# Patient Record
Sex: MALE | Race: WHITE | NOT HISPANIC OR LATINO | URBAN - METROPOLITAN AREA
[De-identification: names, ages, dates, MRNs, and addresses within clinical notes are randomized per-mention and may not be internally consistent; named-entity substitution may affect disease eponyms.]

---

## 2017-05-31 ENCOUNTER — OFFICE VISIT - HEALTHEAST (OUTPATIENT)
Dept: FAMILY MEDICINE | Facility: CLINIC | Age: 47
End: 2017-05-31

## 2017-05-31 DIAGNOSIS — A09 TRAVELERS' DIARRHEA: ICD-10-CM

## 2017-05-31 DIAGNOSIS — E66.3 OVERWEIGHT (BMI 25.0-29.9): ICD-10-CM

## 2017-05-31 DIAGNOSIS — Z71.84 COUNSELING ABOUT TRAVEL: ICD-10-CM

## 2017-05-31 ASSESSMENT — MIFFLIN-ST. JEOR: SCORE: 1910.09

## 2021-05-30 VITALS — BODY MASS INDEX: 27.85 KG/M2 | WEIGHT: 217 LBS | HEIGHT: 74 IN

## 2021-06-11 NOTE — PROGRESS NOTES
"Assessment:    1. Counseling about travel  typhoid (VIVOTIF) SR capsule   2. Travelers' diarrhea     3. Overweight (BMI 25.0-29.9)           Plan:    Travel counseling was provided for upcoming travel to [facility] July 26 - August 11, 2017.  Immunizations reviewed and up-to-date other than repeat oral typhoid vaccination was prescribed.  Discussed traveler's diarrhea and resistance pattern and [place] and will use azithromycin 1 g ×1 if concern for traveler's diarrhea.  Patient declines malaria coverage.  Routine precautions while traveling were reviewed.  Recommend regular physical exams.  Weight goal less than 210 pounds initially, less than 205 pounds ideally for overweight status.        Subjective:    Dmitri Lara is seen today for travel counseling.  Traveling to [facility] July 26 - August 11, 2017.  Has had oral typhoid vaccination in 2009 and will need repeat treatment.  Questions regarding traveler's diarrhea.  Overweight status noted.  Otherwise healthy.  Past medical social and family history reviewed and updated as noted below otherwise .  3 children.  Non-smoker.  Occasional alcohol.  Comprehensive review of systems as above otherwise all negative.    History reviewed. No pertinent surgical history.     History reviewed. No pertinent family history.     History reviewed. No pertinent past medical history.     Social History   Substance Use Topics     Smoking status: Never Smoker     Smokeless tobacco: None     Alcohol use None        Current Outpatient Prescriptions   Medication Sig Dispense Refill     typhoid (VIVOTIF) SR capsule Take 1 capsule by mouth every other day for 4 doses. 4 capsule 0     No current facility-administered medications for this visit.           Objective:    Vitals:    05/31/17 1249   BP: 100/60   Pulse: 60   Weight: 217 lb (98.4 kg)   Height: 6' 1.75\" (1.873 m)      Body mass index is 28.05 kg/(m^2).    Alert.  No apparent distress.  Overweight status with BMI 28. "  Chest clear.  Cardiac exam regular.  HEENT exam normal.  Neck supple.  Extremities warm and dry.  No rash.  Nonfocal neurologic exam.